# Patient Record
Sex: FEMALE | Race: WHITE | Employment: PART TIME | URBAN - METROPOLITAN AREA
[De-identification: names, ages, dates, MRNs, and addresses within clinical notes are randomized per-mention and may not be internally consistent; named-entity substitution may affect disease eponyms.]

---

## 2017-02-13 ENCOUNTER — OFFICE VISIT (OUTPATIENT)
Dept: FAMILY MEDICINE CLINIC | Age: 62
End: 2017-02-13

## 2017-02-13 VITALS
RESPIRATION RATE: 12 BRPM | HEART RATE: 84 BPM | DIASTOLIC BLOOD PRESSURE: 76 MMHG | WEIGHT: 123 LBS | HEIGHT: 65 IN | SYSTOLIC BLOOD PRESSURE: 124 MMHG | TEMPERATURE: 98.1 F | BODY MASS INDEX: 20.49 KG/M2

## 2017-02-13 DIAGNOSIS — J02.0 STREP PHARYNGITIS: ICD-10-CM

## 2017-02-13 DIAGNOSIS — J02.9 PHARYNGITIS, UNSPECIFIED ETIOLOGY: Primary | ICD-10-CM

## 2017-02-13 LAB — S PYO AG THROAT QL: POSITIVE

## 2017-02-13 PROCEDURE — 99213 OFFICE O/P EST LOW 20 MIN: CPT | Performed by: FAMILY MEDICINE

## 2017-02-13 PROCEDURE — 87880 STREP A ASSAY W/OPTIC: CPT | Performed by: FAMILY MEDICINE

## 2017-02-13 RX ORDER — AMOXICILLIN 875 MG/1
875 TABLET, COATED ORAL 2 TIMES DAILY
Qty: 20 TABLET | Refills: 0 | Status: SHIPPED | OUTPATIENT
Start: 2017-02-13 | End: 2017-02-23

## 2017-04-17 ENCOUNTER — HOSPITAL ENCOUNTER (OUTPATIENT)
Dept: WOMENS IMAGING | Age: 62
Discharge: HOME | End: 2017-04-17
Payer: COMMERCIAL

## 2017-04-17 DIAGNOSIS — Z13.820 OSTEOPOROSIS SCREENING: ICD-10-CM

## 2017-04-17 DIAGNOSIS — Z12.39 SCREENING BREAST EXAMINATION: ICD-10-CM

## 2017-04-17 PROCEDURE — G0202 SCR MAMMO BI INCL CAD: HCPCS

## 2017-04-17 PROCEDURE — 77080 DXA BONE DENSITY AXIAL: CPT

## 2018-01-17 ENCOUNTER — HOSPITAL ENCOUNTER (OUTPATIENT)
Dept: WOMENS IMAGING | Age: 63
Discharge: HOME | End: 2018-01-17
Payer: COMMERCIAL

## 2018-01-17 DIAGNOSIS — Z13.820 SCREENING FOR OSTEOPOROSIS: ICD-10-CM

## 2018-01-17 DIAGNOSIS — Z12.31 ENCOUNTER FOR SCREENING MAMMOGRAM FOR BREAST CANCER: ICD-10-CM

## 2018-01-17 PROCEDURE — 77067 SCR MAMMO BI INCL CAD: CPT

## 2018-01-17 PROCEDURE — 77080 DXA BONE DENSITY AXIAL: CPT

## 2018-03-26 ENCOUNTER — TELEPHONE (OUTPATIENT)
Dept: FAMILY MEDICINE CLINIC | Age: 63
End: 2018-03-26

## 2018-06-02 DIAGNOSIS — L24.9 IRRITANT CONTACT DERMATITIS, UNSPECIFIED TRIGGER: Primary | ICD-10-CM

## 2018-06-02 RX ORDER — PERMETHRIN 50 MG/G
CREAM TOPICAL
Qty: 60 G | Refills: 0 | Status: SHIPPED | OUTPATIENT
Start: 2018-06-02 | End: 2018-12-07

## 2018-12-07 ENCOUNTER — OFFICE VISIT (OUTPATIENT)
Dept: FAMILY MEDICINE CLINIC | Age: 63
End: 2018-12-07
Payer: COMMERCIAL

## 2018-12-07 VITALS
TEMPERATURE: 98.5 F | DIASTOLIC BLOOD PRESSURE: 66 MMHG | HEART RATE: 77 BPM | BODY MASS INDEX: 19.89 KG/M2 | SYSTOLIC BLOOD PRESSURE: 118 MMHG | OXYGEN SATURATION: 98 % | WEIGHT: 119.4 LBS | RESPIRATION RATE: 14 BRPM | HEIGHT: 65 IN

## 2018-12-07 DIAGNOSIS — Z13.820 SCREENING FOR OSTEOPOROSIS: ICD-10-CM

## 2018-12-07 DIAGNOSIS — L30.9 ECZEMA, UNSPECIFIED TYPE: Primary | ICD-10-CM

## 2018-12-07 PROBLEM — M81.0 AGE-RELATED OSTEOPOROSIS WITHOUT CURRENT PATHOLOGICAL FRACTURE: Status: ACTIVE | Noted: 2018-12-07

## 2018-12-07 PROBLEM — M81.0 AGE-RELATED OSTEOPOROSIS WITHOUT CURRENT PATHOLOGICAL FRACTURE: Status: RESOLVED | Noted: 2018-12-07 | Resolved: 2018-12-07

## 2018-12-07 PROCEDURE — 99213 OFFICE O/P EST LOW 20 MIN: CPT | Performed by: FAMILY MEDICINE

## 2018-12-07 RX ORDER — TACROLIMUS 0.3 MG/G
OINTMENT TOPICAL
Qty: 30 G | Refills: 0 | Status: CANCELLED | OUTPATIENT
Start: 2018-12-07

## 2018-12-07 ASSESSMENT — PATIENT HEALTH QUESTIONNAIRE - PHQ9
2. FEELING DOWN, DEPRESSED OR HOPELESS: 0
SUM OF ALL RESPONSES TO PHQ9 QUESTIONS 1 & 2: 0
1. LITTLE INTEREST OR PLEASURE IN DOING THINGS: 0
SUM OF ALL RESPONSES TO PHQ QUESTIONS 1-9: 0
SUM OF ALL RESPONSES TO PHQ QUESTIONS 1-9: 0

## 2019-02-14 ENCOUNTER — TELEPHONE (OUTPATIENT)
Dept: FAMILY MEDICINE CLINIC | Age: 64
End: 2019-02-14

## 2019-03-06 ENCOUNTER — TELEPHONE (OUTPATIENT)
Dept: ADMINISTRATIVE | Age: 64
End: 2019-03-06

## 2023-10-02 PROBLEM — H40.013 OPEN ANGLE WITH BORDERLINE FINDINGS, LOW RISK, BILATERAL: Status: ACTIVE | Noted: 2023-10-02

## 2023-10-02 PROBLEM — Z98.890 HISTORY OF LASER REFRACTIVE SURGERY: Status: ACTIVE | Noted: 2023-10-02

## 2023-10-02 PROBLEM — Z96.1 PSEUDOPHAKIA, LEFT EYE: Status: ACTIVE | Noted: 2023-10-02

## 2023-10-02 PROBLEM — H40.003 GLAUCOMA SUSPECT, BOTH EYES: Status: ACTIVE | Noted: 2023-10-02

## 2023-10-02 PROBLEM — H52.10 MYOPIA WITH ASTIGMATISM AND PRESBYOPIA: Status: ACTIVE | Noted: 2023-10-02

## 2023-10-02 PROBLEM — H18.413 ARCUS SENILIS, BILATERAL: Status: ACTIVE | Noted: 2023-10-02

## 2023-10-02 PROBLEM — H17.823 PERIPHERAL OPACITY OF CORNEA, BILATERAL: Status: ACTIVE | Noted: 2023-10-02

## 2023-10-02 PROBLEM — H25.813 COMBINED FORM OF AGE-RELATED CATARACT, BOTH EYES: Status: ACTIVE | Noted: 2023-10-02

## 2023-10-02 PROBLEM — H35.363 DRUSEN OF MACULA OF BOTH EYES: Status: ACTIVE | Noted: 2023-10-02

## 2023-10-02 PROBLEM — H52.4 MYOPIA WITH ASTIGMATISM AND PRESBYOPIA: Status: ACTIVE | Noted: 2023-10-02

## 2023-10-02 PROBLEM — H52.209 MYOPIA WITH ASTIGMATISM AND PRESBYOPIA: Status: ACTIVE | Noted: 2023-10-02

## 2023-10-02 PROBLEM — H25.13 NUCLEAR SCLEROTIC CATARACT OF BOTH EYES: Status: ACTIVE | Noted: 2023-10-02

## 2023-10-02 RX ORDER — RIBOFLAVIN (VITAMIN B2) 100 MG
1 TABLET ORAL
COMMUNITY

## 2023-10-02 RX ORDER — ACETAMINOPHEN 500 MG
TABLET ORAL
COMMUNITY
End: 2023-10-02 | Stop reason: SDUPTHER

## 2023-10-02 RX ORDER — PSYLLIUM HUSK 0.4 G
CAPSULE ORAL
COMMUNITY

## 2023-10-02 RX ORDER — MULTIVITAMIN
TABLET ORAL
COMMUNITY

## 2023-10-02 RX ORDER — PNV NO.95/FERROUS FUM/FOLIC AC 28MG-0.8MG
1 TABLET ORAL
COMMUNITY

## 2023-10-02 RX ORDER — TACROLIMUS 1 MG/G
OINTMENT TOPICAL
COMMUNITY

## 2023-10-04 ENCOUNTER — ANCILLARY PROCEDURE (OUTPATIENT)
Dept: RADIOLOGY | Facility: CLINIC | Age: 68
End: 2023-10-04
Payer: MEDICARE

## 2023-10-04 ENCOUNTER — OFFICE VISIT (OUTPATIENT)
Dept: ORTHOPEDIC SURGERY | Facility: CLINIC | Age: 68
End: 2023-10-04
Payer: MEDICARE

## 2023-10-04 DIAGNOSIS — M25.561 ACUTE PAIN OF RIGHT KNEE: ICD-10-CM

## 2023-10-04 DIAGNOSIS — M25.561 ACUTE PAIN OF RIGHT KNEE: Primary | ICD-10-CM

## 2023-10-04 PROCEDURE — 1036F TOBACCO NON-USER: CPT | Performed by: ORTHOPAEDIC SURGERY

## 2023-10-04 PROCEDURE — 99213 OFFICE O/P EST LOW 20 MIN: CPT | Mod: PO | Performed by: ORTHOPAEDIC SURGERY

## 2023-10-04 PROCEDURE — 99203 OFFICE O/P NEW LOW 30 MIN: CPT | Performed by: ORTHOPAEDIC SURGERY

## 2023-10-04 PROCEDURE — 73564 X-RAY EXAM KNEE 4 OR MORE: CPT | Mod: RIGHT SIDE | Performed by: ORTHOPAEDIC SURGERY

## 2023-10-04 PROCEDURE — 73564 X-RAY EXAM KNEE 4 OR MORE: CPT | Mod: RT

## 2023-10-04 PROCEDURE — 1159F MED LIST DOCD IN RCRD: CPT | Performed by: ORTHOPAEDIC SURGERY

## 2023-10-04 NOTE — LETTER
October 4, 2023     Elvin Townsend MD  [address] [address]    Patient: Keren Carbajal   YOB: 1955   Date of Visit: 10/4/2023       Dear Dr. Elvin Townsend MD:    Thank you for referring Keren Carbajal to me for evaluation. Below are my notes for this consultation.  If you have questions, please do not hesitate to call me. I look forward to following your patient along with you.       Sincerely,     Grayson Lozada MD      CC: No Recipients  ______________________________________________________________________________________        History: Laverne is here for her right knee.  She has had a several year history of intermittent knee pain.  Is gotten a bit more frequent.  She is now having more days with soreness and swelling.  She does not like to take any medicines or wear a brace.  Here today as a new patient.    Past medical history: Multiple  Medications: Multiple  Allergies: No known drug allergies    Please refer to the intake H&P regarding the patient's review of systems, family history and social history as was done today    HEENT: Normal  Lungs: Clear to auscultation  Heart: RRR  Abdomen: Soft, nontender  Skin: clear  Extremity: She has tenderness more medially.  1+ crepitus with range of motion.  Mild varus alignment.  Negative Lachman and posterior drawer.  Slight laxity with varus stress.  Contralateral exam is normal for strength, motion, stability and neurovascular assessment.    Radiographs: XR knee right 4+ views    Result Date: 10/4/2023  Interpreted By:  Grayson Lozada, STUDY: XR KNEE RIGHT 4+ VIEWS;  ;  10/4/2023 11:36 am   INDICATION: Signs/Symptoms:pain.   ACCESSION NUMBER(S): [id number]   ORDERING CLINICIAN: GRAYSON LOZADA   FINDINGS: AP lateral notch sunrise views of the right knee shows moderate degenerative change with medial joint space narrowing and slight varus alignment. No acute fractures identified.     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    Plan: She is getting a bit more varus deformity and understands her medial arthritis is worsening.  She does not feel it is bad enough however to do injections or medicines at this time.  We discussed multiple options for knee arthritis and she is going to hold off at this time.  If she has more significant pain or deformity she will call to consider these further.  All questions answered with the patient.

## 2023-10-04 NOTE — PROGRESS NOTES
History: Laverne is here for her right knee.  She has had a several year history of intermittent knee pain.  Is gotten a bit more frequent.  She is now having more days with soreness and swelling.  She does not like to take any medicines or wear a brace.  Here today as a new patient.    Past medical history: Multiple  Medications: Multiple  Allergies: No known drug allergies    Please refer to the intake H&P regarding the patient's review of systems, family history and social history as was done today    HEENT: Normal  Lungs: Clear to auscultation  Heart: RRR  Abdomen: Soft, nontender  Skin: clear  Extremity: She has tenderness more medially.  1+ crepitus with range of motion.  Mild varus alignment.  Negative Lachman and posterior drawer.  Slight laxity with varus stress.  Contralateral exam is normal for strength, motion, stability and neurovascular assessment.    Radiographs: XR knee right 4+ views    Result Date: 10/4/2023  Interpreted By:  Grayson Lozada, STUDY: XR KNEE RIGHT 4+ VIEWS;  ;  10/4/2023 11:36 am   INDICATION: Signs/Symptoms:pain.   ACCESSION NUMBER(S): [id number]   ORDERING CLINICIAN: GRAYSON LOZADA   FINDINGS: AP lateral notch sunrise views of the right knee shows moderate degenerative change with medial joint space narrowing and slight varus alignment. No acute fractures identified.     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    Plan: She is getting a bit more varus deformity and understands her medial arthritis is worsening.  She does not feel it is bad enough however to do injections or medicines at this time.  We discussed multiple options for knee arthritis and she is going to hold off at this time.  If she has more significant pain or deformity she will call to consider these further.  All questions answered with the patient.

## 2023-10-09 ENCOUNTER — APPOINTMENT (OUTPATIENT)
Dept: ORTHOPEDIC SURGERY | Facility: CLINIC | Age: 68
End: 2023-10-09
Payer: MEDICARE

## 2023-11-10 ENCOUNTER — TELEPHONE (OUTPATIENT)
Dept: PRIMARY CARE | Facility: CLINIC | Age: 68
End: 2023-11-10
Payer: MEDICARE

## 2023-11-10 NOTE — TELEPHONE ENCOUNTER
PATIENT CALLED IN REQUESTING TO RE-ESTABLISH CARE WITH DR. COSTA. PATIENT STATES SHE DID SOMETHING TO THE BACK OF HER KNEE AND SAW DR. WELLINGTON AT [facility] [place]. PATIENT STATES HE ONLY ORDERED AN X RAY AND IT CAME BACK NEGATIVE BESIDES BEING BOWLEGGED. PATIENT IS REQUESTING AN MRI BECAUSE SHE CAN HARDLY WALK AND FEELS LIKE SOMETHING IS TORN IN THE BACK OF HER KNEE OR HAS A CYST BEHIND HER KNEE. I INFORMED PATIENT THAT SHE HASN'T BEEN SEEN IN OVER 3 YEARS SO I WOULD HAVE TO SEND A MESSAGE TO DR. COSTA AND HE WOULD HAVE TO APPROVE HER TO BE TAKEN ON as A NEW PATIENT AND THAT DR. COSTA IS BOOKING ABOUT A MONTH OR SO OUT. PATIENT IS GOING TO CONTACT DR. WELLINGTON'S OFFICE IN THE MEAN TIME AND SEE IF HE CAN ORDER AN MRI.     PLEASE ADVISE ON NEW PATIENT VISIT.